# Patient Record
Sex: MALE | Race: WHITE
[De-identification: names, ages, dates, MRNs, and addresses within clinical notes are randomized per-mention and may not be internally consistent; named-entity substitution may affect disease eponyms.]

---

## 2018-04-19 NOTE — EDM.PDOC
ED HPI GENERAL MEDICAL PROBLEM





- General


Stated Complaint: HEAD INJURY


Time Seen by Provider: 04/19/18 11:48


Source of Information: Reports: Patient


History Limitations: Reports: No Limitations





- History of Present Illness


INITIAL COMMENTS - FREE TEXT/NARRATIVE: 


HISTORY AND PHYSICAL:





History of present illness:


Patient is a 37-year-old male who presents to the emergency room with 

complaints of her scalp pain and jaw pain after being hit by a large rubber 

hose yesterday. He states while at work a large rubber hose swung up and hit 

him under the chin and flipped around hitting him in the left temporal area. He 

denies any loss of consciousness but states "I was pretty close".


Patient was brought to the [facility] for evaluation and had an x-ray of 

his mandible. He states his wife called his primary doctor today who requested 

he have a head CT for further evaluation as they felt that this was not 

adequate and imagine. He reports "I'm fine edges feel like I got beat up". 

Abrasion noted under his chin and along the left temporalis and cheek bone 

area. Musculoskeletal tenderness to the trapezius muscles. 





Review of systems: 


As per history of present illness and below otherwise all systems reviewed and 

negative.





Past medical history: 


As per history of present illness and as reviewed below otherwise 

noncontributory.





Surgical history: 


As per history of present illness and as reviewed below otherwise 

noncontributory.





Social history: 


No reported history of drug or alcohol abuse.





Family history: 


As per history of present illness and as reviewed below otherwise 

noncontributory.





Physical exam:


General: Well-developed and well-nourished 37-year-old male. Alert and 

oriented. Nontoxic appearing and in no acute distress.


HEENT: Abrasion noted under the chin and to the left temporalis/upper cheek 

bone area. Mild tenderness with palpation. No obvious deformities or crepitus. 

normocephalic, pupils equal and reactive bilaterally, negative for conjunctival 

pallor or scleral icterus, mucous membranes moist, throat clear, neck supple, 

nontender, trachea midline. No drooling or trismus noted. No meningeal signs


Lungs: Clear to auscultation, breath sounds equal bilaterally, chest nontender.


Heart: S1S2, regular rate and rhythm without overt murmur


Abdomen: Soft, nondistended, nontender. Negative for masses or 

hepatosplenomegaly. Negative for costovertebral tenderness.


Pelvis: Stable nontender.


Genitourinary: Deferred.


Rectal: Deferred.


C-Spine/Back: No pinpoint vertebral tenderness upon palpation. No crepitus, step

-offs or obvious deformities noted. Does fully ambulatory with no difficulty or 

deficits. No urinary or fecal incontinence. Able to walk on toes and heels 

without any difficulty.


Skin: Abrasion noted under mid chin and to left temporal/upper check bone. 

Otherwise intact, warm, dry. No lesions or rashes noted. Multiple tattoos noted 

to exposed skin


Extremities: Moves all extremities per self without difficulty or deficits. 

Full active range of motion. Nontender with palpation. He is negative for cords 

or calf pain. Neurovascular unremarkable.


Neuro: Awake, alert, oriented. Cranial nerves II through XII unremarkable. 

Cerebellum unremarkable. Motor and sensory unremarkable throughout. Exam 

nonfocal.





Notes:


Physical examination is within normal limits. We will do a head CT as he does 

have abrasions to the lower mandible and temporal area.


Tetanus was last updated 2016


Head and C-spine CT shows no acute findings. I did share this with the patient. 

He has no further questions at this time. We'll discharge him to home with 

education.





Diagnostics:


Head CT





Therapeutics:


[]





Impression: 


Head injury


Abrasion





Plan:


1. Please review the head injury instructions that have been printed for you.


2. Tylenol and/or ibuprofen as needed for pain management. Ice for the first 24 

hours you may been alternate to gentle heat.


3. Follow-up with your primary caregiver as needed. Return to the ED as needed 

and as discussed.





Definitive disposition and diagnosis as appropriate pending reevaluation and 

review of above.





Duration: Day(s):


Location: Reports: Head, Face


  ** Face


Pain Score (Numeric/FACES): 5





- Related Data


 Allergies











Allergy/AdvReac Type Severity Reaction Status Date / Time


 


Penicillins Allergy  Anaphylactic Verified 04/19/18 11:52





   Shock  











Home Meds: 


 Home Meds





. [No Known Home Meds]  04/19/18 [History]











ED ROS GENERAL





- Review of Systems


Review Of Systems: ROS reveals no pertinent complaints other than HPI.





ED EXAM, HEAD INJURY





- Physical Exam


Exam: See Below (See dictation)





Course





- Vital Signs


Last Recorded V/S: 


 Last Vital Signs











Temp  97.7 F   04/19/18 12:13


 


Pulse  81   04/19/18 12:13


 


Resp  16   04/19/18 12:13


 


BP  138/82   04/19/18 12:13


 


Pulse Ox  98   04/19/18 12:13














Departure





- Departure


Time of Disposition: 12:53


Disposition: Home, Self-Care 01


Clinical Impression: 


 Abrasion





Head injury


Qualifiers:


 Encounter type: subsequent encounter Qualified Code(s): S09.90XD - Unspecified 

injury of head, subsequent encounter








- Discharge Information


Instructions:  Head Injury, Adult, Easy-to-Read, Abrasion


Referrals: 


PCP,None [Primary Care Provider] - 


Additional Instructions: 


The following information is given to patients seen in the emergency department 

who are being discharged to home. This information is to outline your options 

for follow-up care. We provide all patients seen in our emergency department 

with a follow-up referral.





The need for follow-up, as well as the timing and circumstances, are variable 

depending upon the specifics of your emergency department visit.





If you don't have a primary care physician on staff, we will provide you with a 

referral. We always advise you to contact your personal physician following an 

emergency department visit to inform them of the circumstance of the visit and 

for follow-up with them and/or the need for any referrals to a consulting 

specialist.





The emergency department will also refer you to a specialist when appropriate. 

This referral assures that you have the opportunity for follow-up care with a 

specialist. All of these measure are taken in an effort to provide you with 

optimal care, which includes your follow-up.





Under all circumstances we always encourage you to contact your private 

physician who remains a resource for coordinating your care. When calling for 

follow-up care, please make the office aware that this follow-up is from your 

recent emergency room visit. If for any reason you are refused follow-up, 

please contact the [facility] Emergency 

Department at [phone] and asked to speak to the emergency department 

charge nurse.





[facility]


Primary Care


[address] [place] [id number]


Phone: [phone]


Fax: [phone]





1. Please review the head injury instructions that have been printed for you.


2. Tylenol and/or ibuprofen as needed for pain management. Ice for the first 24 

hours you may been alternate to gentle heat.


3. Follow-up with your primary caregiver as needed. Return to the ED as needed 

and as discussed.

## 2018-05-18 ENCOUNTER — HOSPITAL ENCOUNTER (EMERGENCY)
Dept: HOSPITAL 56 - MW.ED | Age: 38
Discharge: LEFT BEFORE DISCHARGE | End: 2018-05-18
Payer: COMMERCIAL

## 2018-05-18 DIAGNOSIS — Z53.21: Primary | ICD-10-CM

## 2018-05-18 DIAGNOSIS — Z88.0: ICD-10-CM

## 2018-05-18 NOTE — EDM.PDOC
ED HPI GENERAL MEDICAL PROBLEM





- General


Stated Complaint: VISION PROBLEMS


Time Seen by Provider: 05/18/18 07:32


Source of Information: Reports: Patient





- History of Present Illness


INITIAL COMMENTS - FREE TEXT/NARRATIVE: 


HISTORY AND PHYSICAL:





History of present illness:


Patient left before being seen. As per nursing patient did have a appointment 

with an eye doctor that he preferred to go to. We did call to confirm 

appointment.





Review of systems: 


As per history of present illness and below otherwise all systems reviewed and 

negative.





Past medical history: 


As per history of present illness and as reviewed below otherwise 

noncontributory.





Surgical history: 


As per history of present illness and as reviewed below otherwise 

noncontributory.





Social history: 


No reported history of drug or alcohol abuse.





Family history: 


As per history of present illness and as reviewed below otherwise 

noncontributory.





Physical exam:


HEENT: Atraumatic, normocephalic, pupils reactive, negative for conjunctival 

pallor or scleral icterus, mucous membranes moist, throat clear, neck supple, 

nontender, trachea midline.


Lungs: Clear to auscultation, breath sounds equal bilaterally, chest nontender.


Heart: S1S2, regular, negative for clicks, rubs, or JVD.


Abdomen: Soft, nondistended, nontender. Negative for masses or 

hepatosplenomegaly. Negative for costovertebral tenderness.


Pelvis: Stable nontender.


Genitourinary: Deferred.


Rectal: Deferred.


Extremities: Atraumatic, negative for cords or calf pain. Neurovascular 

unremarkable.


Neuro: Awake, alert, oriented. Cranial nerves II through XII unremarkable. 

Cerebellum unremarkable. Motor and sensory unremarkable throughout. Exam 

nonfocal.





Diagnostics:


[]





Therapeutics:


[]





Impression: 


[]





Plan:


Patient left before being seen. As per nursing patient did have a appointment 

with an eye doctor that he preferred to go to. We did call to confirm 

appointment.











- Related Data


 Allergies











Allergy/AdvReac Type Severity Reaction Status Date / Time


 


Penicillins Allergy  Anaphylactic Verified 04/19/18 11:52





   Shock  











Home Meds: 


 Home Meds





. [No Known Home Meds]  04/19/18 [History]











Past Medical History





- Past Health History


Medical/Surgical History: Denies Medical/Surgical History


Cardiovascular History: Reports: None


Respiratory History: Reports: None


Gastrointestinal History: Reports: None


Genitourinary History: Reports: None


Musculoskeletal History: Reports: Amputation


Other Musculoskeletal History: (R) hand fingers


Neurological History: Reports: Concussion


Psychiatric History: Reports: None


Endocrine/Metabolic History: Reports: None


Dermatologic History: Reports: None





- Past Surgical History


Head Surgeries/Procedures: Reports: None


HEENT Surgical History: Reports: Detached Retina


Respiratory Surgical History: Reports: None


Male  Surgical History: Reports: None


Musculoskeletal Surgical History: Reports: Amputation





Social & Family History





- Family History


Family Medical History: Noncontributory





- Caffeine Use


Caffeine Use: Reports: Energy Drinks





ED ROS GENERAL





- Review of Systems


Review Of Systems: Unable To Obtain





ED EXAM, GENERAL





- Physical Exam


Exam: Not Obtained





Departure





- Departure


Time of Disposition: 07:54


Disposition: Eloped 07


Clinical Impression: 


 Refused assessment of physical health








- Discharge Information


Referrals: 


PCP,None [Primary Care Provider] -